# Patient Record
Sex: FEMALE | Race: WHITE | NOT HISPANIC OR LATINO | URBAN - METROPOLITAN AREA
[De-identification: names, ages, dates, MRNs, and addresses within clinical notes are randomized per-mention and may not be internally consistent; named-entity substitution may affect disease eponyms.]

---

## 2017-12-27 ENCOUNTER — INPATIENT (INPATIENT)
Facility: HOSPITAL | Age: 82
LOS: 3 days | Discharge: HOME | End: 2017-12-31
Attending: FAMILY MEDICINE | Admitting: FAMILY MEDICINE
Payer: MEDICARE

## 2017-12-27 PROCEDURE — 99284 EMERGENCY DEPT VISIT MOD MDM: CPT

## 2018-07-19 ENCOUNTER — INPATIENT (INPATIENT)
Facility: HOSPITAL | Age: 83
LOS: 3 days | Discharge: NURSING, REHAB OR LONG-TERM CARE | End: 2018-07-23
Attending: FAMILY MEDICINE | Admitting: FAMILY MEDICINE
Payer: MEDICARE

## 2018-07-19 PROCEDURE — 71045 X-RAY EXAM CHEST 1 VIEW: CPT | Mod: 26

## 2018-07-19 PROCEDURE — 72192 CT PELVIS W/O DYE: CPT | Mod: 26

## 2018-07-19 PROCEDURE — 73523 X-RAY EXAM HIPS BI 5/> VIEWS: CPT | Mod: 26

## 2018-07-19 PROCEDURE — 99285 EMERGENCY DEPT VISIT HI MDM: CPT

## 2018-07-23 ENCOUNTER — INPATIENT (INPATIENT)
Facility: HOSPITAL | Age: 83
LOS: 7 days | Discharge: HOME | End: 2018-07-31
Attending: FAMILY MEDICINE | Admitting: FAMILY MEDICINE

## 2018-07-30 ENCOUNTER — OUTPATIENT (OUTPATIENT)
Dept: OUTPATIENT SERVICES | Facility: HOSPITAL | Age: 83
LOS: 1 days | End: 2018-07-30

## 2019-05-22 ENCOUNTER — INPATIENT (INPATIENT)
Facility: HOSPITAL | Age: 84
LOS: 1 days | Discharge: HOME HEALTH | End: 2019-05-24
Attending: FAMILY MEDICINE
Payer: MEDICARE

## 2019-05-22 PROCEDURE — 70450 CT HEAD/BRAIN W/O DYE: CPT | Mod: 26

## 2019-05-22 PROCEDURE — 73523 X-RAY EXAM HIPS BI 5/> VIEWS: CPT | Mod: 26

## 2019-05-22 PROCEDURE — 72110 X-RAY EXAM L-2 SPINE 4/>VWS: CPT | Mod: 26

## 2019-05-22 PROCEDURE — 99285 EMERGENCY DEPT VISIT HI MDM: CPT

## 2019-05-22 PROCEDURE — 99221 1ST HOSP IP/OBS SF/LOW 40: CPT

## 2019-05-22 PROCEDURE — 72125 CT NECK SPINE W/O DYE: CPT | Mod: 26

## 2019-05-23 PROCEDURE — 93010 ELECTROCARDIOGRAM REPORT: CPT

## 2019-05-23 PROCEDURE — 70450 CT HEAD/BRAIN W/O DYE: CPT | Mod: 26

## 2021-05-06 PROBLEM — Z00.00 ENCOUNTER FOR PREVENTIVE HEALTH EXAMINATION: Status: ACTIVE | Noted: 2021-05-06

## 2021-06-07 ENCOUNTER — RX RENEWAL (OUTPATIENT)
Age: 86
End: 2021-06-07

## 2021-06-30 ENCOUNTER — APPOINTMENT (OUTPATIENT)
Dept: FAMILY MEDICINE | Facility: CLINIC | Age: 86
End: 2021-06-30
Payer: MEDICARE

## 2021-06-30 ENCOUNTER — LABORATORY RESULT (OUTPATIENT)
Age: 86
End: 2021-06-30

## 2021-06-30 VITALS
BODY MASS INDEX: 17.49 KG/M2 | WEIGHT: 105 LBS | OXYGEN SATURATION: 98 % | SYSTOLIC BLOOD PRESSURE: 106 MMHG | HEART RATE: 113 BPM | TEMPERATURE: 96.6 F | DIASTOLIC BLOOD PRESSURE: 70 MMHG | HEIGHT: 65 IN

## 2021-06-30 DIAGNOSIS — E03.9 HYPOTHYROIDISM, UNSPECIFIED: ICD-10-CM

## 2021-06-30 DIAGNOSIS — E55.9 VITAMIN D DEFICIENCY, UNSPECIFIED: ICD-10-CM

## 2021-06-30 PROCEDURE — 99213 OFFICE O/P EST LOW 20 MIN: CPT | Mod: 25

## 2021-06-30 PROCEDURE — 36415 COLL VENOUS BLD VENIPUNCTURE: CPT

## 2021-06-30 RX ORDER — ERGOCALCIFEROL 1.25 MG/1
1.25 MG CAPSULE ORAL
Qty: 13 | Refills: 0 | Status: ACTIVE | COMMUNITY
Start: 1900-01-01 | End: 1900-01-01

## 2021-06-30 RX ORDER — POTASSIUM CHLORIDE 1500 MG/1
20 TABLET, FILM COATED, EXTENDED RELEASE ORAL
Qty: 180 | Refills: 0 | Status: ACTIVE | COMMUNITY
Start: 2021-06-07 | End: 1900-01-01

## 2021-06-30 RX ORDER — LANCETS 33 GAUGE
EACH MISCELLANEOUS
Refills: 0 | Status: ACTIVE | COMMUNITY

## 2021-06-30 RX ORDER — BLOOD SUGAR DIAGNOSTIC
STRIP MISCELLANEOUS TWICE DAILY
Refills: 0 | Status: ACTIVE | COMMUNITY

## 2021-06-30 NOTE — PHYSICAL EXAM
[No Acute Distress] : no acute distress [Well Nourished] : well nourished [Well Developed] : well developed [Well-Appearing] : well-appearing [Normal Sclera/Conjunctiva] : normal sclera/conjunctiva [PERRL] : pupils equal round and reactive to light [EOMI] : extraocular movements intact [Normal Outer Ear/Nose] : the outer ears and nose were normal in appearance [Normal Oropharynx] : the oropharynx was normal [No JVD] : no jugular venous distention [No Lymphadenopathy] : no lymphadenopathy [Supple] : supple [Thyroid Normal, No Nodules] : the thyroid was normal and there were no nodules present [No Respiratory Distress] : no respiratory distress  [No Accessory Muscle Use] : no accessory muscle use [Clear to Auscultation] : lungs were clear to auscultation bilaterally [Normal Rate] : normal rate  [Regular Rhythm] : with a regular rhythm [Normal S1, S2] : normal S1 and S2 [No Murmur] : no murmur heard [No Carotid Bruits] : no carotid bruits [No Abdominal Bruit] : a ~M bruit was not heard ~T in the abdomen [No Varicosities] : no varicosities [Pedal Pulses Present] : the pedal pulses are present [No Edema] : there was no peripheral edema [No Palpable Aorta] : no palpable aorta [No Extremity Clubbing/Cyanosis] : no extremity clubbing/cyanosis [Soft] : abdomen soft [Non Tender] : non-tender [Non-distended] : non-distended [No Masses] : no abdominal mass palpated [No HSM] : no HSM [Normal Bowel Sounds] : normal bowel sounds [Normal Posterior Cervical Nodes] : no posterior cervical lymphadenopathy [Normal Anterior Cervical Nodes] : no anterior cervical lymphadenopathy [No CVA Tenderness] : no CVA  tenderness [No Spinal Tenderness] : no spinal tenderness [No Joint Swelling] : no joint swelling [Grossly Normal Strength/Tone] : grossly normal strength/tone [No Rash] : no rash [Coordination Grossly Intact] : coordination grossly intact [No Focal Deficits] : no focal deficits [Normal Gait] : normal gait [Deep Tendon Reflexes (DTR)] : deep tendon reflexes were 2+ and symmetric [Normal Affect] : the affect was normal [Normal Insight/Judgement] : insight and judgment were intact [de-identified] : Ectomorphic

## 2021-06-30 NOTE — PLAN
[FreeTextEntry1] : 86-year-old female homebound presents with family member for renewal of medication\par \par \par \par \par \par \par \par \par .\par \par \par \par \par \par \par \par \par \par She is essentially homebound and was\par \par \par \par \par \par \par \par \par \par \par \par \par \par \par \par \par \par \par \par \par \par \par \par \par \par \par \par \par \par \par \par \par \par \par \par \par \par \par \par \par \par \par \par \par \par \par \par \par \par \par \par \par \par \par \par \par \par \par \par \par \par \par \par \par \par \par \par \par \par \par \par \par \par \par \par \par \par \par \par \par \par \par \par \par \par \par \par \par \par \par \par \par \par \par \par \par \par \par \par \par \par \par \par \par \par \par \par \par \par \par \par \par \par \par \par \par \par \par \par \par \par \par \par \par \par \par \par \par \par \par \par \par \par \par \par \par \par \par \par \par \par \par \par \par \par \par \par \par \par \par \par \par \par \par \par \par \par \par \par \par \par \par \par \par \par \par \par \par \par \par \par \par \par \par \par \par \par \par \par \par \par \par \par \par \par \par \par \par \par \par \par \par \par \par \par \par \par \par \par \par \par \par \par \par \par \par \par \par \par \par \par \par \par \par \par \par \par \par \par \par \par \par This 36-year-old female is homebound.  She is brought by her daughter-in-law for the visit.  She has a complicated medical history and requires medication renewals.\par Chronic atrial fibrillation/hypothyroidism/type 2 diabetes/schizoaffective disorder with bipolar disorder\par \par Her medications are reviewed and renewed.\par She takes Eliquis and digitalis for chronic atrial fibrillation with tachyarrhythmia\par She takes vitamin D supplementation for deficiency\par She takes L-thyroxine for hypothyroidism\par She takes diabetes medication in the form of Metformin for type 2 diabetes\par She takes Seroquel and tolterodine for schizoaffective disease\par Lab work is drawn and her medications are reviewed and renewed\par \par \par \par \par \par \par \par \par \par \par \par \par \par \par \par \par \par \par \par \par \par \par \par \par \par \par \par \par \par \par \par \par \par \par \par \par \par \par \par \par \par \par \par \par \par \par \par \par \par \par \par \par \par \par \par \par \par \par \par \par \par \par \par \par \par \par \par \par \par \par \par \par \par \par \par \par \par \par \par \par \par \par \par \par \par \par \par \par \par \par \par \par \par \par \par \par \par \par \par \par \par \par \par \par \par \par \par

## 2021-07-01 LAB
25(OH)D3 SERPL-MCNC: 46.3 NG/ML
ALBUMIN SERPL ELPH-MCNC: 3.5 G/DL
ALP BLD-CCNC: 116 U/L
ALT SERPL-CCNC: 11 U/L
ANION GAP SERPL CALC-SCNC: 12 MMOL/L
AST SERPL-CCNC: 12 U/L
BASOPHILS # BLD AUTO: 0.04 K/UL
BASOPHILS NFR BLD AUTO: 0.8 %
BILIRUB SERPL-MCNC: 0.4 MG/DL
BUN SERPL-MCNC: 20 MG/DL
CALCIUM SERPL-MCNC: 8.6 MG/DL
CHLORIDE SERPL-SCNC: 107 MMOL/L
CHOLEST SERPL-MCNC: 119 MG/DL
CO2 SERPL-SCNC: 21 MMOL/L
CREAT SERPL-MCNC: 1.06 MG/DL
EOSINOPHIL # BLD AUTO: 0.06 K/UL
EOSINOPHIL NFR BLD AUTO: 1.1 %
ESTIMATED AVERAGE GLUCOSE: 123 MG/DL
GLUCOSE SERPL-MCNC: 106 MG/DL
HBA1C MFR BLD HPLC: 5.9 %
HCT VFR BLD CALC: 47.1 %
HDLC SERPL-MCNC: 42 MG/DL
HGB BLD-MCNC: 14.8 G/DL
IMM GRANULOCYTES NFR BLD AUTO: 0.2 %
LDLC SERPL CALC-MCNC: 62 MG/DL
LYMPHOCYTES # BLD AUTO: 0.93 K/UL
LYMPHOCYTES NFR BLD AUTO: 17.7 %
MAN DIFF?: NORMAL
MCHC RBC-ENTMCNC: 29.6 PG
MCHC RBC-ENTMCNC: 31.4 GM/DL
MCV RBC AUTO: 94.2 FL
MONOCYTES # BLD AUTO: 0.53 K/UL
MONOCYTES NFR BLD AUTO: 10.1 %
NEUTROPHILS # BLD AUTO: 3.67 K/UL
NEUTROPHILS NFR BLD AUTO: 70.1 %
NONHDLC SERPL-MCNC: 77 MG/DL
PLATELET # BLD AUTO: 239 K/UL
POTASSIUM SERPL-SCNC: 4.4 MMOL/L
PROT SERPL-MCNC: 7.6 G/DL
RBC # BLD: 5 M/UL
RBC # FLD: 18.5 %
SODIUM SERPL-SCNC: 139 MMOL/L
TRIGL SERPL-MCNC: 74 MG/DL
TSH SERPL-ACNC: 4.44 UIU/ML
WBC # FLD AUTO: 5.24 K/UL

## 2021-07-16 ENCOUNTER — RX RENEWAL (OUTPATIENT)
Age: 86
End: 2021-07-16

## 2021-07-16 RX ORDER — METFORMIN ER 500 MG 500 MG/1
500 TABLET ORAL DAILY
Qty: 30 | Refills: 0 | Status: ACTIVE | COMMUNITY
Start: 2021-07-16 | End: 1900-01-01

## 2021-07-17 ENCOUNTER — INPATIENT (INPATIENT)
Facility: HOSPITAL | Age: 86
LOS: 2 days | Discharge: HOME HEALTH | End: 2021-07-20
Attending: STUDENT IN AN ORGANIZED HEALTH CARE EDUCATION/TRAINING PROGRAM | Admitting: STUDENT IN AN ORGANIZED HEALTH CARE EDUCATION/TRAINING PROGRAM
Payer: MEDICARE

## 2021-07-17 ENCOUNTER — OUTPATIENT (OUTPATIENT)
Dept: OUTPATIENT SERVICES | Facility: HOSPITAL | Age: 86
LOS: 1 days | End: 2021-07-17

## 2021-07-17 PROCEDURE — 70450 CT HEAD/BRAIN W/O DYE: CPT | Mod: 26

## 2021-07-17 PROCEDURE — 93010 ELECTROCARDIOGRAM REPORT: CPT

## 2021-07-17 PROCEDURE — 71275 CT ANGIOGRAPHY CHEST: CPT | Mod: 26

## 2021-07-17 PROCEDURE — 72125 CT NECK SPINE W/O DYE: CPT | Mod: 26

## 2021-07-17 PROCEDURE — 72170 X-RAY EXAM OF PELVIS: CPT | Mod: 26

## 2021-07-17 PROCEDURE — 71045 X-RAY EXAM CHEST 1 VIEW: CPT | Mod: 26

## 2021-07-17 PROCEDURE — 99285 EMERGENCY DEPT VISIT HI MDM: CPT

## 2021-07-18 ENCOUNTER — OUTPATIENT (OUTPATIENT)
Dept: OUTPATIENT SERVICES | Facility: HOSPITAL | Age: 86
LOS: 1 days | End: 2021-07-18

## 2021-07-18 PROCEDURE — 99223 1ST HOSP IP/OBS HIGH 75: CPT

## 2021-07-19 ENCOUNTER — OUTPATIENT (OUTPATIENT)
Dept: OUTPATIENT SERVICES | Facility: HOSPITAL | Age: 86
LOS: 1 days | End: 2021-07-19

## 2021-07-19 PROCEDURE — 93306 TTE W/DOPPLER COMPLETE: CPT | Mod: 26

## 2021-07-19 PROCEDURE — 70551 MRI BRAIN STEM W/O DYE: CPT | Mod: 26

## 2021-07-20 ENCOUNTER — OUTPATIENT (OUTPATIENT)
Dept: OUTPATIENT SERVICES | Facility: HOSPITAL | Age: 86
LOS: 1 days | End: 2021-07-20

## 2021-07-23 ENCOUNTER — APPOINTMENT (OUTPATIENT)
Dept: CARE COORDINATION | Facility: HOME HEALTH | Age: 86
End: 2021-07-23
Payer: MEDICARE

## 2021-07-23 VITALS — RESPIRATION RATE: 14 BRPM | HEART RATE: 49 BPM | OXYGEN SATURATION: 92 %

## 2021-07-23 PROCEDURE — 99495 TRANSJ CARE MGMT MOD F2F 14D: CPT

## 2021-07-23 PROCEDURE — 99406 BEHAV CHNG SMOKING 3-10 MIN: CPT

## 2021-07-23 RX ORDER — TOLTERODINE TARTRATE 4 MG/1
4 CAPSULE, EXTENDED RELEASE ORAL
Refills: 0 | Status: ACTIVE | COMMUNITY

## 2021-07-23 RX ORDER — APIXABAN 5 MG/1
5 TABLET, FILM COATED ORAL
Refills: 0 | Status: DISCONTINUED | COMMUNITY
End: 2021-07-23

## 2021-07-23 RX ORDER — ATORVASTATIN CALCIUM 20 MG/1
20 TABLET, FILM COATED ORAL
Qty: 30 | Refills: 0 | Status: ACTIVE | COMMUNITY

## 2021-07-23 RX ORDER — LEVOTHYROXINE SODIUM 0.1 MG/1
100 TABLET ORAL DAILY
Qty: 90 | Refills: 0 | Status: ACTIVE | COMMUNITY

## 2021-07-23 RX ORDER — DULOXETINE HYDROCHLORIDE 60 MG/1
60 CAPSULE, DELAYED RELEASE ORAL DAILY
Refills: 0 | Status: ACTIVE | COMMUNITY

## 2021-07-23 RX ORDER — ASPIRIN ENTERIC COATED TABLETS 81 MG 81 MG/1
81 TABLET, DELAYED RELEASE ORAL DAILY
Refills: 0 | Status: ACTIVE | COMMUNITY

## 2021-07-23 RX ORDER — FLUDROCORTISONE ACETATE 0.1 MG
0.1 TABLET ORAL
Refills: 0 | Status: ACTIVE | COMMUNITY

## 2021-07-23 RX ORDER — EMPAGLIFLOZIN 10 MG/1
10 TABLET, FILM COATED ORAL DAILY
Refills: 0 | Status: ACTIVE | COMMUNITY

## 2021-07-23 RX ORDER — MEMANTINE HYDROCHLORIDE 5 MG/1
5 TABLET, FILM COATED ORAL DAILY
Refills: 0 | Status: ACTIVE | COMMUNITY

## 2021-07-23 NOTE — PHYSICAL EXAM
[No Acute Distress] : no acute distress [Well Nourished] : well nourished [Well Developed] : well developed [Well-Appearing] : well-appearing [Normal Sclera/Conjunctiva] : normal sclera/conjunctiva [Normal Outer Ear/Nose] : the outer ears and nose were normal in appearance [No JVD] : no jugular venous distention [No Respiratory Distress] : no respiratory distress  [No Accessory Muscle Use] : no accessory muscle use [Normal Rate] : normal rate  [Regular Rhythm] : with a regular rhythm [Normal S1, S2] : normal S1 and S2 [Pedal Pulses Present] : the pedal pulses are present [No CVA Tenderness] : no CVA  tenderness [No Spinal Tenderness] : no spinal tenderness [No Joint Swelling] : no joint swelling [Grossly Normal Strength/Tone] : grossly normal strength/tone [No Rash] : no rash [No Skin Lesions] : no skin lesions [Coordination Grossly Intact] : coordination grossly intact [No Focal Deficits] : no focal deficits [Normal Affect] : the affect was normal [Normal Mood] : the mood was normal [Person] : oriented to person [Place] : oriented to place [Time] : disoriented to time [Short Term Intact] : short term memory impaired [Span Intact] : the attention span was decreased [Concentration Intact] : a decrease in concentrating ability was observed [Current Events] : inadequate knowledge of current events [Appropriate] : appropriate [Agitated] : not agitated [Anxious] : not anxious [Depressed] : not depressed [Labile] : not labile [Impaired Insight] : intact insight [Rate Slowed] : slowed [de-identified] : Diminished at bases [de-identified] : trace pedal edema

## 2021-07-23 NOTE — PLAN
[FreeTextEntry1] : [facility] referral\par card office to contact daughter for f/u appt with Dr. Valentino

## 2021-07-23 NOTE — HISTORY OF PRESENT ILLNESS
[Post-hospitalization from ___ Hospital] : Post-hospitalization from [unfilled] Hospital [Admitted on: ___] : The patient was admitted on [unfilled] [Discharged on ___] : discharged on [unfilled] [Discharge Summary] : discharge summary [Pertinent Labs] : pertinent labs [Radiology Findings] : radiology findings [Discharge Med List] : discharge medication list [Med Reconciliation] : medication reconciliation has been completed [FreeTextEntry2] : 87 y/o pt admitted with acute sCHF, hx AF not on AC due to fall risk, dementia, BiPolar disorder, hypothyroidism. She was SOB, increased WOB, decline in cognitive ability. Imaging showed bilat pl effusions, neg PE. BNP>30,000, tros neg. SHIV 2/2 CHF, Cr normalized. Pt began on beta blocker q6hr for rate control, diuretic and stable for d/c home with family support. Pt has 24/7 HHA, [facility] referral made. \par Pt seen today in her home for transitional care management with son and HHA present. She was sitting upright in chair, pleasantly confused, no agitation no evidence of SOB. Pt denies any SOB, WEEKS, uses walker for ambulation. No fever, chills, cough reported by HHA. Pt +smoker 2-3 cigs a day.  Denies LE edema. FBG checked weekly by HHA pt runs 120-130's. A1c LAST 5.9 from outpt labs. Pt son reports they would prefer a house calls PCP, they can get her out for a f/u card appt. Daughter to make appt with Dr. Valentino for f/u. Son confirmed pt does not wish to return to the hospital and family is in agreement as she is more disoriented at the hospital.

## 2021-07-23 NOTE — HEALTH RISK ASSESSMENT
[Diabetic Diet] : diabetic [Low Salt Diet] : low salt [Strict Adherence] : strict adherence [Reviewed no changes] : Reviewed, no changes [AdvancecareDate] : 07/2021

## 2021-07-23 NOTE — ASSESSMENT
[FreeTextEntry1] : Naty Heard is being seen after discharge home from [facility]. She was admitted on 7/17/21 for CHF and discharged home on 7/21/21.  Discharge medications were reviewed and reconciled with the current medication list and medications in home.\par \par 1)CHF-new diagnosis\par Pt euvolemic on furosemide, beta blocker\par Denies SOB, WEEKS, LE edema. Daily weights to begin tomorrow\par [employer] TCM teaching: Enforced with patient need for daily weights. Advised to call for an increase of greater than 2 lbs. in a day or 5 pounds in a week. Adhere to low salt diet and educated patient on foods that should be avoided such as processed or fast food. Limit fluids to 1.5 liter a day which is 4-5 glasses. Continue medications as ordered.  Follow up with Cardiologist. Contact information given, patient advised to call with any questions/concerns. \par Message sent to [facility] for f/u appt with Dr. Valentino\par \par 2)Atrial Fibrillation-No palpitations, SOB, dizziness\par HR 49 pt on Metoprolol q6 for rate control. \par On Digoxin\par Advised to give Metoprolol q8hrs while pt awake. BP cuff ordered, once it arrives HHA to check BP prior to am meds and hold Metoprolol for SBP<100; HR <60\par \par 3)Dementia-pt pleasantly confused\par Follows with psych \par PCP no longer provides house calls-family wishes to switch\par Offered [facility] referral son in agreement\par HHA reports sun-downing episodes, on Seroquel nightly. \par \par 4)Diabetes-well-controlled\par Last A1c-5.9 outpt labs\par HHA checks FBG weekly usually 120-130's\par Pt compliant w/low sugar diet, family reports pt agreeable to eating and drinking and no issues. \par s/sx hypo/hyper glycemia reviewed and check BG if increased lethargy or increased confusion to see if attributing factor. Verbalized understanding. \par \par Reminded of TCM program and encouraged pt to call with any questions or concerns and especially with worsening of symptoms. Verbalized understanding.\par \par \par

## 2021-07-23 NOTE — REVIEW OF SYSTEMS
[Joint Pain] : no joint pain [Joint Stiffness] : joint stiffness [Joint Swelling] : no joint swelling [Muscle Weakness] : muscle weakness [Muscle Pain] : no muscle pain [Back Pain] : no back pain [Headache] : no headache [Dizziness] : no dizziness [Fainting] : no fainting [Confusion] : confusion [Memory Loss] : memory loss [Unsteady Walking] : ataxia [Negative] : Psychiatric [FreeTextEntry9] : uses walker for ambulation

## 2021-07-27 ENCOUNTER — APPOINTMENT (OUTPATIENT)
Dept: CARDIOLOGY | Facility: CLINIC | Age: 86
End: 2021-07-27
Payer: MEDICARE

## 2021-07-27 ENCOUNTER — NON-APPOINTMENT (OUTPATIENT)
Age: 86
End: 2021-07-27

## 2021-07-27 DIAGNOSIS — F17.200 NICOTINE DEPENDENCE, UNSPECIFIED, UNCOMPLICATED: ICD-10-CM

## 2021-07-27 PROCEDURE — 99213 OFFICE O/P EST LOW 20 MIN: CPT

## 2021-07-27 RX ORDER — METOPROLOL TARTRATE 50 MG/1
50 TABLET, FILM COATED ORAL 3 TIMES DAILY
Refills: 0 | Status: DISCONTINUED | COMMUNITY
End: 2021-07-27

## 2021-07-27 RX ORDER — FUROSEMIDE 40 MG/1
40 TABLET ORAL
Qty: 90 | Refills: 1 | Status: ACTIVE | COMMUNITY

## 2021-07-27 RX ORDER — LEVOTHYROXINE SODIUM 0.1 MG/1
100 TABLET ORAL DAILY
Qty: 90 | Refills: 0 | Status: DISCONTINUED | COMMUNITY
End: 2021-07-27

## 2021-07-27 RX ORDER — METOPROLOL SUCCINATE 50 MG/1
50 TABLET, EXTENDED RELEASE ORAL DAILY
Qty: 270 | Refills: 1 | Status: ACTIVE | COMMUNITY
Start: 2021-07-27 | End: 1900-01-01

## 2021-07-27 NOTE — HISTORY OF PRESENT ILLNESS
[FreeTextEntry1] : Naty is a 86-year-old female with history of dementia, AF OCH1BM2-PUYr score 5, no anticoagulation with fall risk, HFrEF LVEF30% echo 2021, gait abnormality limited ambulation with a walker.\par \par Patient seen in the office today with her 2 daughters who are able to give accurate history.  Patient is a poor historian.\par \par No history of CAD, MI, revascularization, VHD, TIA, CVA, diabetes, PVD, DVT, PE.\par \par Patient admitted [facility] 7/17/2021 discharge 7/20/2021.  Altered mental status acute on chronic CHF.  Echocardiogram 7/19/2020 1V EF 30%, moderate MR, moderate TR, mild pulmonary hypertension, small pericardial effusion, bilateral pleural effusions.  CHF improved with Lasix and patient discharged home on Lasix 40 mg daily, Florinef 0.1 mg daily, digoxin 0.25 mg daily, Lopressor 50 mg 3 times daily, diabetic medications. \par \par Current fluid balance is optimized we will continue current cardiac medical regimen.  Cardiology follow-up in 3 months.  If increasing edema, PND orthopnea Lasix will be increased to 80 mg daily for 2 days with cardiology office follow-up.

## 2021-07-27 NOTE — PHYSICAL EXAM
[No Acute Distress] : no acute distress [Frail] : frail [Ill-Appearing] : ill-appearing [Cachectic] : cachexia was observed [Normal Conjunctiva] : normal conjunctiva [Normal Venous Pressure] : normal venous pressure [No Carotid Bruit] : no carotid bruit [Normal S1, S2] : normal S1, S2 [No Rub] : no rub [No Gallop] : no gallop [Good Air Entry] : good air entry [Clear Lung Fields] : clear lung fields [No Respiratory Distress] : no respiratory distress  [Soft] : abdomen soft [Non Tender] : non-tender [No Masses/organomegaly] : no masses/organomegaly [Normal Bowel Sounds] : normal bowel sounds [No Cyanosis] : no cyanosis [No Clubbing] : no clubbing [No Varicosities] : no varicosities [No Rash] : no rash [No Skin Lesions] : no skin lesions [Moves all extremities] : moves all extremities [No Focal Deficits] : no focal deficits [Normal Speech] : normal speech [Memory Deficit] : memory deficit [Alert and Oriented] : alert and oriented [Normal memory] : normal memory [de-identified] : Irregular 2/6 OPHELIA [de-identified] : Limited ambulation with a walker [de-identified] : Mild bi pedal edema [de-identified] : Dementia

## 2021-07-27 NOTE — REVIEW OF SYSTEMS
[Dyspnea on exertion] : dyspnea during exertion [Limb Weakness (Paresis)] : limb weakness (Paresis) [Confusion] : confusion was observed [Memory Lapses Or Loss] : memory lapses or loss [Negative] : Heme/Lymph

## 2021-07-27 NOTE — ASSESSMENT
[FreeTextEntry1] : Naty is an 86-year-old female with medical history detailed above and active medical issues including:\par \par - Chronic HFrEF LVEF 30% echo July 2021.  Continue current medical regimen.  Maintain low-sodium diet, \par \par - Chronic AF, high risk [id number] score, no anticoagulation with gait instability and falling\par \par - Hypertension at guideline goal on Lopressor changed to Toprol 150 mg daily\par \par - Gait instability Limited ambulation with a walker\par \par - History of dementia.  History from daughters present during office visit\par \par Patient educated on lifestyle and diet modification with antidiabetic, low sodium low fat diet and avoidance of excessive alcohol. Patient is aware to call with any symptoms or concerns. \par \par Cardiology follow-up 3 months, sooner as needed.  Current cardiac medications remain unchanged renewals are up to date. Repeat labs will be ordered with PMD.\par \par Naty will follow up with Dr. Benjie Mustafa for primary care

## 2021-07-29 ENCOUNTER — APPOINTMENT (OUTPATIENT)
Dept: CARE COORDINATION | Facility: HOME HEALTH | Age: 86
End: 2021-07-29
Payer: MEDICARE

## 2021-07-29 ENCOUNTER — NON-APPOINTMENT (OUTPATIENT)
Age: 86
End: 2021-07-29

## 2021-07-29 VITALS
TEMPERATURE: 95 F | DIASTOLIC BLOOD PRESSURE: 60 MMHG | SYSTOLIC BLOOD PRESSURE: 118 MMHG | OXYGEN SATURATION: 99 % | RESPIRATION RATE: 15 BRPM | HEART RATE: 50 BPM

## 2021-07-29 DIAGNOSIS — F31.9 BIPOLAR DISORDER, UNSPECIFIED: ICD-10-CM

## 2021-07-29 DIAGNOSIS — E11.9 TYPE 2 DIABETES MELLITUS W/OUT COMPLICATIONS: ICD-10-CM

## 2021-07-29 DIAGNOSIS — F03.90 UNSPECIFIED DEMENTIA W/OUT BEHAVIORAL DISTURBANCE: ICD-10-CM
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
## 2021-07-29 DIAGNOSIS — I48.20 CHRONIC ATRIAL FIBRILLATION, UNSP: ICD-10-CM

## 2021-07-29 DIAGNOSIS — I50.9 HEART FAILURE, UNSPECIFIED: ICD-10-CM
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
## 2021-07-29 PROCEDURE — 99495 TRANSJ CARE MGMT MOD F2F 14D: CPT

## 2021-07-29 RX ORDER — MELATONIN 5 MG
5 CAPSULE ORAL
Refills: 0 | Status: ACTIVE | COMMUNITY

## 2021-07-29 RX ORDER — QUETIAPINE 25 MG/1
25 TABLET, FILM COATED ORAL
Refills: 0 | Status: ACTIVE | COMMUNITY

## 2021-07-29 NOTE — ASSESSMENT
[FreeTextEntry1] : Naty Heard is being seen after discharge home from [facility] hospital. She was admitted on 7/17/21 for CHF and discharged home on 7/21/21.  Discharge medications were reviewed and reconciled with the current medication list and medications in home. Her daughter reported a fall to her left shoulder earlier in the week with some bruising, left elbow skin tear no pain currently. \par \par 1)s/p fall at home-pt denies syncope +dizziness. She was using walker at home and caught it on the rug\par She denies pain. Sternal bruising in healing phase.\par Left elbow skin tear approx 2cm no bleeding, Advised leave DEYVI apply bacitracin.\par No edema. no wrist edema. Pt with limited left arm raise due to shoulder pain. No pain w/palpation. Pronation of left arm w/o pain.\par [facility] to schedule w/daughter: CXR including sternum and ribs, left shoulder, humerus, forearm\par Will f/u with results. Any abnormal results will refer to orthopedics. Daughter in agreement w/this plan. \par Advised ice, Tylenol ES for pain. \par \par 2)CHF\par Pt euvolemic on furosemide, beta blocker\par Denies SOB, WEEKS, LE edema. \par [employer] TCM teaching: Enforced with patient need for daily weights. Advised to call for an increase of greater than 2 lbs. in a day or 5 pounds in a week. Adhere to low salt diet and educated patient on foods that should be avoided such as processed or fast food. Limit fluids to 1.5 liter a day which is 4-5 glasses. Continue medications as ordered.  Follow up with Cardiologist. Contact information given, patient advised to call with any questions/concerns. \par Pt saw Dr. Valentino 7/27\par \par 3)Atrial Fibrillation-No palpitations, SOB, +dizziness\par HR 50 pt on Metoprolol rate control. \par On Digoxin\par \par 4)Dementia-pt pleasantly confused\par Follows with psych \par PCP no longer provides house calls-family wishes to switch\par Offered [facility] referral made-phone# provided to daughter to f/u\par Daughter reports insomnia worse this last week, on Seroquel 25mg. Increase Seroquel to 50mg nightly and add Melatonin 5mg at hs. Monitor for daytime somnolence. \par \par 5)Diabetes-well-controlled\par Last A1c-5.9 outpt labs\par HHA checks FBG weekly usually 120-130's\par Pt compliant w/low sugar diet, family reports pt agreeable to eating and drinking and no issues. \par s/sx hypo/hyper glycemia reviewed and check BG if increased lethargy or increased confusion to see if attributing factor. Verbalized understanding. \par \par Reminded of TCM program and encouraged pt to call with any questions or concerns and especially with worsening of symptoms. Verbalized understanding.\par \par \par

## 2021-07-29 NOTE — REVIEW OF SYSTEMS
[Joint Pain] : no joint pain [Joint Stiffness] : joint stiffness [Joint Swelling] : no joint swelling [Muscle Weakness] : muscle weakness [Muscle Pain] : no muscle pain [Back Pain] : no back pain [Headache] : no headache [Dizziness] : no dizziness [Fainting] : no fainting [Confusion] : confusion [Memory Loss] : memory loss [Unsteady Walking] : ataxia [Negative] : Psychiatric [FreeTextEntry9] : uses walker for ambulation [de-identified] : sternal bruising, left elbow skin tear from fall

## 2021-07-29 NOTE — PHYSICAL EXAM
[No Acute Distress] : no acute distress [Well Nourished] : well nourished [Well Developed] : well developed [Well-Appearing] : well-appearing [Normal Sclera/Conjunctiva] : normal sclera/conjunctiva [Normal Outer Ear/Nose] : the outer ears and nose were normal in appearance [No JVD] : no jugular venous distention [No Respiratory Distress] : no respiratory distress  [Clear to Auscultation] : lungs were clear to auscultation bilaterally [No Accessory Muscle Use] : no accessory muscle use [Normal Rate] : normal rate  [Normal S1, S2] : normal S1 and S2 [No Murmur] : no murmur heard [Irregularly Irregular] : irregularly irregular [Pedal Pulses Present] : the pedal pulses are present [Soft] : abdomen soft [Non-distended] : non-distended [Normal Bowel Sounds] : normal bowel sounds [No CVA Tenderness] : no CVA  tenderness [No Joint Swelling] : no joint swelling [Grossly Normal Strength/Tone] : grossly normal strength/tone [No Rash] : no rash [No Skin Lesions] : no skin lesions [Coordination Grossly Intact] : coordination grossly intact [No Focal Deficits] : no focal deficits [Normal Affect] : the affect was normal [Normal Mood] : the mood was normal [Person] : oriented to person [Place] : oriented to place [Time] : disoriented to time [Short Term Intact] : short term memory impaired [Span Intact] : the attention span was decreased [Concentration Intact] : a decrease in concentrating ability was observed [Current Events] : inadequate knowledge of current events [Appropriate] : appropriate [Agitated] : not agitated [Anxious] : not anxious [Depressed] : not depressed [Labile] : not labile [Impaired Insight] : intact insight [Rate Slowed] : slowed [de-identified] : Diminished at bases [de-identified] : trace pedal edema [de-identified] : mild bilat para-spinal tenderness to palpation  [de-identified] : left shoulder non-tender; left arm raise limited to shoulder pain.  5/5, able to pronate left arm w/o pain. elbow w/o edema or redness.  [de-identified] : sternal bruising healing, left elbow skin tear approx 2cm no bleeding

## 2021-07-29 NOTE — COUNSELING
[Fall prevention counseling provided] : Fall prevention counseling provided [Adequate lighting] : Adequate lighting [No throw rugs] : No throw rugs [Use proper foot wear] : Use proper foot wear [Use recommended devices] : Use recommended devices [Risk of tobacco use and health benefits of smoking cessation discussed] : Risk of tobacco use and health benefits of smoking cessation discussed [Willing to Quit Smoking] : Not willing to quit smoking [None] : None [Needs reinforcement, provided] : Patient needs reinforcement on understanding of lifestyle changes and steps needed to achieve self management goal; reinforcement was provided [de-identified] : Pt has family support and 24/7 HHA

## 2021-07-29 NOTE — HISTORY OF PRESENT ILLNESS
[Post-hospitalization from ___ Hospital] : Post-hospitalization from [unfilled] Hospital [Admitted on: ___] : The patient was admitted on [unfilled] [Discharged on ___] : discharged on [unfilled] [Discharge Summary] : discharge summary [Pertinent Labs] : pertinent labs [Radiology Findings] : radiology findings [Discharge Med List] : discharge medication list [Med Reconciliation] : medication reconciliation has been completed [FreeTextEntry2] : 85 y/o pt admitted with acute sCHF, hx AF not on AC due to fall risk, dementia, BiPolar disorder, hypothyroidism. She was SOB, increased WOB, decline in cognitive ability. Imaging showed bilat pl effusions, neg PE. BNP>30,000, tros neg. SHIV 2/2 CHF, Cr normalized. Pt began on beta blocker q6hr for rate control, diuretic and stable for d/c home with family support. Pt has 24/7 HHA, [facility] referral made. \par Pt seen today in her home for transitional care management  follow up with daughter Estella present. She was sitting upright in chair, pleasantly confused, no agitation no evidence of SOB. Pt denies any SOB, WEEKS, uses walker for ambulation. She reports a fall earlier this week on her left shoulder with mild pain, bruising and a skin tear to left elbow. She did not seek any treatment for the fall. Pt taking Seroquel at hs, not sleeping well since last visit. \par No fever, chills, cough reported. Pt +smoker 2-3 cigs a day.  Denies LE edema. Would prefer a house calls PCP, they can get her out for a f/u card appt.saw Dr. Valentino 7/27.  [facility] message left for referral, no call as of today to daughter for intake.

## 2021-07-29 NOTE — PLAN
[FreeTextEntry1] : [facility] referral\par card office to contact daughter for f/u appt with Dr. Valentino

## 2021-07-30 ENCOUNTER — NON-APPOINTMENT (OUTPATIENT)
Age: 86
End: 2021-07-30

## 2021-08-02 ENCOUNTER — RX RENEWAL (OUTPATIENT)
Age: 86
End: 2021-08-02

## 2021-08-02 RX ORDER — LEVOTHYROXINE SODIUM 0.09 MG/1
88 TABLET ORAL
Qty: 90 | Refills: 0 | Status: ACTIVE | COMMUNITY
Start: 2021-06-07 | End: 1900-01-01

## 2021-08-18 DIAGNOSIS — Z86.39 PERSONAL HISTORY OF OTHER ENDOCRINE, NUTRITIONAL AND METABOLIC DISEASE: ICD-10-CM

## 2021-09-15 ENCOUNTER — RX RENEWAL (OUTPATIENT)
Age: 86
End: 2021-09-15

## 2021-09-15 RX ORDER — TOLTERODINE TARTRATE 4 MG/1
4 CAPSULE, EXTENDED RELEASE ORAL
Qty: 90 | Refills: 0 | Status: ACTIVE | COMMUNITY
Start: 2021-06-07 | End: 1900-01-01

## 2021-09-24 ENCOUNTER — RX RENEWAL (OUTPATIENT)
Age: 86
End: 2021-09-24

## 2021-10-08 ENCOUNTER — RX RENEWAL (OUTPATIENT)
Age: 86
End: 2021-10-08

## 2021-10-08 RX ORDER — QUETIAPINE FUMARATE 25 MG/1
25 TABLET ORAL
Qty: 90 | Refills: 0 | Status: ACTIVE | COMMUNITY
Start: 2021-10-08 | End: 1900-01-01

## 2021-10-12 ENCOUNTER — APPOINTMENT (OUTPATIENT)
Dept: CARDIOLOGY | Facility: CLINIC | Age: 86
End: 2021-10-12

## 2021-11-12 ENCOUNTER — RX RENEWAL (OUTPATIENT)
Age: 86
End: 2021-11-12

## 2021-11-12 RX ORDER — DIGOXIN 125 UG/1
125 TABLET ORAL DAILY
Qty: 30 | Refills: 0 | Status: ACTIVE | COMMUNITY
Start: 2021-09-24 | End: 1900-01-01

## 2022-02-03 ENCOUNTER — APPOINTMENT (OUTPATIENT)
Dept: CARDIOLOGY | Facility: CLINIC | Age: 87
End: 2022-02-03

## 2024-09-05 NOTE — COUNSELING
[Fall prevention counseling provided] : Fall prevention counseling provided [Adequate lighting] : Adequate lighting [No throw rugs] : No throw rugs [Use proper foot wear] : Use proper foot wear [Use recommended devices] : Use recommended devices [Risk of tobacco use and health benefits of smoking cessation discussed] : Risk of tobacco use and health benefits of smoking cessation discussed [Willing to Quit Smoking] : Not willing to quit smoking [Tobacco Use Cessation Intermediate Greater Than 3 Minutes Up to 10 Minutes] : Tobacco Use Cessation Intermediate Greater Than 3 Minutes Up to 10 Minutes [FreeTextEntry1] : 5 [None] : None [Needs reinforcement, provided] : Patient needs reinforcement on understanding of lifestyle changes and steps needed to achieve self management goal; reinforcement was provided [de-identified] : Pt has family support and 24/7 HHA Previously Declined (within the last year)